# Patient Record
Sex: MALE
[De-identification: names, ages, dates, MRNs, and addresses within clinical notes are randomized per-mention and may not be internally consistent; named-entity substitution may affect disease eponyms.]

---

## 2022-05-31 PROBLEM — Z00.00 ENCOUNTER FOR PREVENTIVE HEALTH EXAMINATION: Status: ACTIVE | Noted: 2022-05-31

## 2022-06-01 ENCOUNTER — APPOINTMENT (OUTPATIENT)
Dept: ORTHOPEDIC SURGERY | Facility: CLINIC | Age: 62
End: 2022-06-01

## 2022-06-01 VITALS — WEIGHT: 220 LBS | HEIGHT: 68 IN | BODY MASS INDEX: 33.34 KG/M2

## 2022-06-01 DIAGNOSIS — Z78.9 OTHER SPECIFIED HEALTH STATUS: ICD-10-CM

## 2022-06-01 PROCEDURE — 99204 OFFICE O/P NEW MOD 45 MIN: CPT | Mod: 25

## 2022-06-01 PROCEDURE — 20610 DRAIN/INJ JOINT/BURSA W/O US: CPT | Mod: 50

## 2022-06-07 NOTE — PHYSICAL EXAM
[Left] : left knee [Right] : right knee [] : moderate effusion [AP] : anteroposterior [Lateral] : lateral [Mild tricompartmental OA medial narrowing] : Mild tricompartmental OA medial narrowing [FreeTextEntry3] : Varicose veins [TWNoteComboBox7] : flexion 120 degrees [de-identified] : extension 5 degrees

## 2022-06-07 NOTE — HISTORY OF PRESENT ILLNESS
[de-identified] : 6/1/22: 62M (Retired ) c/o bilateral knee pain but mainly left knee pain due to a bakers cyst. Saw his rheumatologist who ordered XR and venous doppler at [facility] for both knees; brought in reports. Received CSI injection in L knee 1 month ago w/ relief for short period of time.Rheum doc. C/O pain and swelling in the knees and over calf. No prior injury.

## 2022-06-07 NOTE — ASSESSMENT
[FreeTextEntry1] : 62 M with B/L knee OA with effusions\par \par 1) Aspiration only today, recent L csi with Rheum doc one month ago\par 2) discussed varicosities\par 3) benefits of H/A injections reviewed, will request auth for visco injections\par 4) cryotherapy\par 5) return to start visco series when auth

## 2022-06-07 NOTE — PROCEDURE
[Large Joint Injection] : Large joint injection [Bilateral] : bilaterally of the [Knee] : knee [Pain] : pain [Inflammation] : inflammation [X-ray evidence of Osteoarthritis on this or prior visit] : x-ray evidence of Osteoarthritis on this or prior visit [Alcohol] : alcohol [Betadine] : betadine [Ethyl Chloride sprayed topically] : ethyl chloride sprayed topically [Sterile technique used] : sterile technique used [___ cc    1%] : Lidocaine ~Vcc of 1%  [Effusion] : effusion [] : Patient tolerated procedure well [Call if redness, pain or fever occur] : call if redness, pain or fever occur [Apply ice for 15min out of every hour for the next 12-24 hours as tolerated] : apply ice for 15 minutes out of every hour for the next 12-24 hours as tolerated [Patient was advised to rest the joint(s) for ____ days] : patient was advised to rest the joint(s) for [unfilled] days [Patient had decreased mobility in the joint] : patient had decreased mobility in the joint [de-identified] : L 75cc, R 35cc [de-identified] : clear

## 2022-06-08 ENCOUNTER — APPOINTMENT (OUTPATIENT)
Dept: ORTHOPEDIC SURGERY | Facility: CLINIC | Age: 62
End: 2022-06-08
Payer: COMMERCIAL

## 2022-06-08 VITALS — WEIGHT: 220 LBS | HEIGHT: 68 IN | BODY MASS INDEX: 33.34 KG/M2

## 2022-06-08 PROCEDURE — 99212 OFFICE O/P EST SF 10 MIN: CPT | Mod: 25

## 2022-06-08 PROCEDURE — 20610 DRAIN/INJ JOINT/BURSA W/O US: CPT

## 2022-06-08 NOTE — HISTORY OF PRESENT ILLNESS
[de-identified] : 6/8/22: Here for b/l knee euflexxa injection #1. \par \par 6/1/22: 62M (Retired ) c/o bilateral knee pain but mainly left knee pain due to a bakers cyst. Saw his rheumatologist who ordered XR and venous doppler at [facility] for both knees; brought in reports. Received CSI injection in L knee 1 month ago w/ relief for short period of time.Rheum doc. C/O pain and swelling in the knees and over calf. No prior injury.

## 2022-06-08 NOTE — DISCUSSION/SUMMARY
[de-identified] : 62m with b/l knee djd. Euflexxa #1 Injection tolerated well. Post injection instructions reviewed.\par 1) wbat, cryotherapy\par 2) rtc 1 week\par \par Entered by Keshia Johnson acting as scribe.\par

## 2022-06-08 NOTE — DISCUSSION/SUMMARY
[de-identified] : 62m with b/l knee djd. Euflexxa #1 Injection tolerated well. Post injection instructions reviewed.\par 1) wbat, cryotherapy\par 2) rtc 1 week\par \par Entered by Keshia Johnson acting as scribe.\par

## 2022-06-08 NOTE — HISTORY OF PRESENT ILLNESS
[de-identified] : 6/8/22: Here for b/l knee euflexxa injection #1. \par \par 6/1/22: 62M (Retired ) c/o bilateral knee pain but mainly left knee pain due to a bakers cyst. Saw his rheumatologist who ordered XR and venous doppler at [facility] for both knees; brought in reports. Received CSI injection in L knee 1 month ago w/ relief for short period of time.Rheum doc. C/O pain and swelling in the knees and over calf. No prior injury.

## 2022-06-15 ENCOUNTER — APPOINTMENT (OUTPATIENT)
Dept: ORTHOPEDIC SURGERY | Facility: CLINIC | Age: 62
End: 2022-06-15
Payer: COMMERCIAL

## 2022-06-15 PROCEDURE — 20611 DRAIN/INJ JOINT/BURSA W/US: CPT

## 2022-06-15 PROCEDURE — 99212 OFFICE O/P EST SF 10 MIN: CPT | Mod: 25

## 2022-06-15 NOTE — HISTORY OF PRESENT ILLNESS
[de-identified] : 6/15/22: Here to f/up b/l knees and Euflexxa #2\par 6/8/22: Here for b/l knee euflexxa injection #1. \par \par 6/1/22: 62M (Retired ) c/o bilateral knee pain but mainly left knee pain due to a bakers cyst. Saw his rheumatologist who ordered XR and venous doppler at [facility] for both knees; brought in reports. Received CSI injection in L knee 1 month ago w/ relief for short period of time.Rheum doc. C/O pain and swelling in the knees and over calf. No prior injury.

## 2022-06-15 NOTE — PHYSICAL EXAM
[FreeTextEntry3] : Varicose veins [TWNoteComboBox7] : flexion 120 degrees [de-identified] : extension 5 degrees [Left] : left knee [Right] : right knee [] : moderate effusion [AP] : anteroposterior [Lateral] : lateral [Mild tricompartmental OA medial narrowing] : Mild tricompartmental OA medial narrowing

## 2022-06-15 NOTE — PROCEDURE
[Effusion] : effusion [#1] : series #1 [de-identified] : L 14.5cc, R 18cc [de-identified] : clear [Large Joint Injection] : Large joint injection [Bilateral] : bilaterally of the [Knee] : knee [Pain] : pain [Inflammation] : inflammation [X-ray evidence of Osteoarthritis on this or prior visit] : x-ray evidence of Osteoarthritis on this or prior visit [Alcohol] : alcohol [Betadine] : betadine [Ethyl Chloride sprayed topically] : ethyl chloride sprayed topically [Sterile technique used] : sterile technique used [___ cc    1%] : Lidocaine ~Vcc of 1%  [Euflexxa] : Euflexxa [#2] : series #2 [] : Patient tolerated procedure well [Call if redness, pain or fever occur] : call if redness, pain or fever occur [Apply ice for 15min out of every hour for the next 12-24 hours as tolerated] : apply ice for 15 minutes out of every hour for the next 12-24 hours as tolerated [Patient was advised to rest the joint(s) for ____ days] : patient was advised to rest the joint(s) for [unfilled] days [Previous OTC use and PT nontherapeutic] : patient has tried OTC's including aspirin, Ibuprofen, Aleve, etc or prescription NSAIDS, and/or exercises at home and/or physical therapy without satisfactory response [Patient had decreased mobility in the joint] : patient had decreased mobility in the joint

## 2022-06-15 NOTE — PHYSICAL EXAM
[FreeTextEntry3] : Varicose veins [TWNoteComboBox7] : flexion 120 degrees [de-identified] : extension 5 degrees [Left] : left knee [Right] : right knee [] : moderate effusion [AP] : anteroposterior [Lateral] : lateral [Mild tricompartmental OA medial narrowing] : Mild tricompartmental OA medial narrowing

## 2022-06-15 NOTE — PHYSICAL EXAM
[FreeTextEntry3] : Varicose veins [TWNoteComboBox7] : flexion 120 degrees [de-identified] : extension 5 degrees

## 2022-06-15 NOTE — PROCEDURE
[Effusion] : effusion [#1] : series #1 [de-identified] : L 14.5cc, R 18cc [de-identified] : clear [Large Joint Injection] : Large joint injection [Bilateral] : bilaterally of the [Knee] : knee [Pain] : pain [Inflammation] : inflammation [X-ray evidence of Osteoarthritis on this or prior visit] : x-ray evidence of Osteoarthritis on this or prior visit [Alcohol] : alcohol [Betadine] : betadine [Ethyl Chloride sprayed topically] : ethyl chloride sprayed topically [Sterile technique used] : sterile technique used [___ cc    1%] : Lidocaine ~Vcc of 1%  [Euflexxa] : Euflexxa [#2] : series #2 [] : Patient tolerated procedure well [Call if redness, pain or fever occur] : call if redness, pain or fever occur [Apply ice for 15min out of every hour for the next 12-24 hours as tolerated] : apply ice for 15 minutes out of every hour for the next 12-24 hours as tolerated [Patient was advised to rest the joint(s) for ____ days] : patient was advised to rest the joint(s) for [unfilled] days [Previous OTC use and PT nontherapeutic] : patient has tried OTC's including aspirin, Ibuprofen, Aleve, etc or prescription NSAIDS, and/or exercises at home and/or physical therapy without satisfactory response [Patient had decreased mobility in the joint] : patient had decreased mobility in the joint

## 2022-06-15 NOTE — DISCUSSION/SUMMARY
[de-identified] : 62m with b/l knee djd. Euflexxa #2 Injection tolerated well. Post injection instructions reviewed.\par 1) wbat, cryotherapy\par 2) rtc 1 week\par \par Entered by Keshia Johnson acting as scribe.\par

## 2022-06-15 NOTE — PROCEDURE
[Prior failure or difficult injection] : prior failure or difficult injection [All ultrasound images have been permanently captured and stored accordingly in our picture archiving and communication system] : All ultrasound images have been permanently captured and stored accordingly in our picture archiving and communication system

## 2022-06-22 ENCOUNTER — APPOINTMENT (OUTPATIENT)
Dept: ORTHOPEDIC SURGERY | Facility: CLINIC | Age: 62
End: 2022-06-22
Payer: COMMERCIAL

## 2022-06-22 VITALS — HEIGHT: 68 IN | BODY MASS INDEX: 33.34 KG/M2 | WEIGHT: 220 LBS

## 2022-06-22 PROCEDURE — 99212 OFFICE O/P EST SF 10 MIN: CPT | Mod: 25

## 2022-06-22 PROCEDURE — 20611 DRAIN/INJ JOINT/BURSA W/US: CPT | Mod: 50

## 2022-06-22 NOTE — PHYSICAL EXAM
[Left] : left knee [Right] : right knee [] : moderate effusion [AP] : anteroposterior [Lateral] : lateral [Mild tricompartmental OA medial narrowing] : Mild tricompartmental OA medial narrowing [FreeTextEntry3] : Varicose veins [TWNoteComboBox7] : flexion 120 degrees [de-identified] : extension 5 degrees

## 2022-06-22 NOTE — PROCEDURE
[Large Joint Injection] : Large joint injection [Bilateral] : bilaterally of the [Knee] : knee [Pain] : pain [Inflammation] : inflammation [X-ray evidence of Osteoarthritis on this or prior visit] : x-ray evidence of Osteoarthritis on this or prior visit [Alcohol] : alcohol [Betadine] : betadine [Ethyl Chloride sprayed topically] : ethyl chloride sprayed topically [Sterile technique used] : sterile technique used [___ cc    1%] : Lidocaine ~Vcc of 1%  [Euflexxa] : Euflexxa [] : Patient tolerated procedure well [Call if redness, pain or fever occur] : call if redness, pain or fever occur [Apply ice for 15min out of every hour for the next 12-24 hours as tolerated] : apply ice for 15 minutes out of every hour for the next 12-24 hours as tolerated [Patient was advised to rest the joint(s) for ____ days] : patient was advised to rest the joint(s) for [unfilled] days [Previous OTC use and PT nontherapeutic] : patient has tried OTC's including aspirin, Ibuprofen, Aleve, etc or prescription NSAIDS, and/or exercises at home and/or physical therapy without satisfactory response [Patient had decreased mobility in the joint] : patient had decreased mobility in the joint [Prior failure or difficult injection] : prior failure or difficult injection [All ultrasound images have been permanently captured and stored accordingly in our picture archiving and communication system] : All ultrasound images have been permanently captured and stored accordingly in our picture archiving and communication system [#3] : series #3

## 2022-06-22 NOTE — DISCUSSION/SUMMARY
[de-identified] : 62m with b/l knee djd. Euflexxa #3 Injection tolerated well. Post injection instructions reviewed.\par 1) wbat, cryotherapy\par 2) rtc 6  weeks\par \par Entered by Keshia Johnson acting as scribe.\par

## 2022-06-22 NOTE — HISTORY OF PRESENT ILLNESS
[de-identified] : 6/22/22: Here for b/l knees euflexxa injection #3. Both are slightly better since first injection but still c/o pain. \par 6/15/22: Here to f/up b/l knees and Euflexxa #2\par 6/8/22: Here for b/l knee euflexxa injection #1. \par \par 6/1/22: 62M (Retired ) c/o bilateral knee pain but mainly left knee pain due to a bakers cyst. Saw his rheumatologist who ordered XR and venous doppler at [facility] for both knees; brought in reports. Received CSI injection in L knee 1 month ago w/ relief for short period of time.Rheum doc. C/O pain and swelling in the knees and over calf. No prior injury.

## 2022-08-03 ENCOUNTER — APPOINTMENT (OUTPATIENT)
Dept: ORTHOPEDIC SURGERY | Facility: CLINIC | Age: 62
End: 2022-08-03

## 2022-08-03 VITALS — BODY MASS INDEX: 33.34 KG/M2 | WEIGHT: 220 LBS | HEIGHT: 68 IN

## 2022-08-03 DIAGNOSIS — M25.462 EFFUSION, LEFT KNEE: ICD-10-CM

## 2022-08-03 DIAGNOSIS — E11.9 TYPE 2 DIABETES MELLITUS W/OUT COMPLICATIONS: ICD-10-CM

## 2022-08-03 DIAGNOSIS — M25.461 EFFUSION, RIGHT KNEE: ICD-10-CM

## 2022-08-03 PROCEDURE — 99213 OFFICE O/P EST LOW 20 MIN: CPT

## 2022-08-03 RX ORDER — MELOXICAM 15 MG/1
15 TABLET ORAL
Qty: 90 | Refills: 1 | Status: ACTIVE | COMMUNITY
Start: 2022-08-03 | End: 1900-01-01

## 2022-08-03 NOTE — PHYSICAL EXAM
[Left] : left knee [Right] : right knee [AP] : anteroposterior [Lateral] : lateral [Mild tricompartmental OA medial narrowing] : Mild tricompartmental OA medial narrowing [] : effusion [FreeTextEntry3] : Varicose veins [TWNoteComboBox7] : flexion 120 degrees [de-identified] : extension 5 degrees

## 2022-08-03 NOTE — PROCEDURE
[Bilateral] : bilaterally of the [Euflexxa] : Euflexxa [#3] : series #3 [] : Patient tolerated procedure well [Call if redness, pain or fever occur] : call if redness, pain or fever occur [Apply ice for 15min out of every hour for the next 12-24 hours as tolerated] : apply ice for 15 minutes out of every hour for the next 12-24 hours as tolerated [Patient was advised to rest the joint(s) for ____ days] : patient was advised to rest the joint(s) for [unfilled] days [Previous OTC use and PT nontherapeutic] : patient has tried OTC's including aspirin, Ibuprofen, Aleve, etc or prescription NSAIDS, and/or exercises at home and/or physical therapy without satisfactory response [Patient had decreased mobility in the joint] : patient had decreased mobility in the joint [Prior failure or difficult injection] : prior failure or difficult injection [All ultrasound images have been permanently captured and stored accordingly in our picture archiving and communication system] : All ultrasound images have been permanently captured and stored accordingly in our picture archiving and communication system

## 2022-08-03 NOTE — HISTORY OF PRESENT ILLNESS
[de-identified] : 8-[birth year]-33 He is known to have had csi from rheum in may (Both knees), 6-1 aspirated by us, followed by Euflexxa injections both completed 6/22. Notes improvement but still pain and limitations with ambulation \par \par \par 6/22/22: Here for b/l knees euflexxa injection #3. Both are slightly better since first injection but still c/o pain. \par 6/15/22: Here to f/up b/l knees and Euflexxa #2\par 6/8/22: Here for b/l knee euflexxa injection #1. \par \par 6/1/22: 62M (Retired ) c/o bilateral knee pain but mainly left knee pain due to a bakers cyst. Saw his rheumatologist who ordered XR and venous doppler at [facility] for both knees; brought in reports. Received CSI injection in L knee 1 month ago w/ relief for short period of time.Rheum doc. C/O pain and swelling in the knees and over calf. No prior injury.

## 2022-08-03 NOTE — ASSESSMENT
[FreeTextEntry1] : discussed the role of possible repeat csi (but he last had in may and has pmh dm) repeat ha injections in 6 months , possible total knee replacement when all fails. 
shortly

## 2022-09-07 ENCOUNTER — APPOINTMENT (OUTPATIENT)
Dept: ORTHOPEDIC SURGERY | Facility: CLINIC | Age: 62
End: 2022-09-07

## 2022-09-07 VITALS — WEIGHT: 220 LBS | BODY MASS INDEX: 33.34 KG/M2 | HEIGHT: 68 IN

## 2022-09-07 DIAGNOSIS — M17.11 UNILATERAL PRIMARY OSTEOARTHRITIS, RIGHT KNEE: ICD-10-CM

## 2022-09-07 DIAGNOSIS — M17.12 UNILATERAL PRIMARY OSTEOARTHRITIS, LEFT KNEE: ICD-10-CM

## 2022-09-07 PROCEDURE — J3490M: CUSTOM

## 2022-09-07 PROCEDURE — 20610 DRAIN/INJ JOINT/BURSA W/O US: CPT | Mod: 50

## 2022-09-07 PROCEDURE — 99213 OFFICE O/P EST LOW 20 MIN: CPT | Mod: 25

## 2022-09-07 NOTE — PROCEDURE
[] : Patient tolerated procedure well [Call if redness, pain or fever occur] : call if redness, pain or fever occur [Apply ice for 15min out of every hour for the next 12-24 hours as tolerated] : apply ice for 15 minutes out of every hour for the next 12-24 hours as tolerated [Patient was advised to rest the joint(s) for ____ days] : patient was advised to rest the joint(s) for [unfilled] days [Previous OTC use and PT nontherapeutic] : patient has tried OTC's including aspirin, Ibuprofen, Aleve, etc or prescription NSAIDS, and/or exercises at home and/or physical therapy without satisfactory response [Patient had decreased mobility in the joint] : patient had decreased mobility in the joint [Prior failure or difficult injection] : prior failure or difficult injection [All ultrasound images have been permanently captured and stored accordingly in our picture archiving and communication system] : All ultrasound images have been permanently captured and stored accordingly in our picture archiving and communication system [FreeTextEntry3] : Large joint injection was performed of the b/l knees. An injection of Lidocaine 3cc of 1% , Bupivacaine (Marcaine) 6cc of 0.5% , Triamcinolone (Kenalog) 2cc of 40 mg  was used. \par Patient was advised to call if redness, pain or fever occur and apply ice for 15 minutes out of every hour for the next 12-24 hours as tolerated. \par \par Patient has tried OTC's including aspirin, Ibuprofen, Aleve, etc or prescription NSAIDS, and/or exercises at home and/or physical therapy without satisfactory response, patient had decreased mobility in the joint and the risks benefits, and alternatives have been discussed, and verbal consent was obtained. \par The site was prepped with alcohol, betadine and ethyl chloride sprayed topically\par \par The risks, benefits and contents of the injection have been discussed.  Risks include but are not limited to allergic reaction, flare reaction, permanent white skin discoloration at the injection site and infection.  The patient understands the risks and agrees to having the injection.  All questions have been answered.\par \par

## 2022-09-07 NOTE — PHYSICAL EXAM
[Left] : left knee [Right] : right knee [AP] : anteroposterior [Lateral] : lateral [Mild tricompartmental OA medial narrowing] : Mild tricompartmental OA medial narrowing [] : mild effusion [FreeTextEntry3] : Varicose veins [TWNoteComboBox7] : flexion 120 degrees [de-identified] : extension 5 degrees

## 2022-09-07 NOTE — HISTORY OF PRESENT ILLNESS
[de-identified] : 9/7/22: Here for follow-up B/L knees. No noticeable improvement since last visit, Pain has remained the same. \par \par 8-3-33 He is known to have had csi from rheum in may (Both knees), 6-1 aspirated by us, followed by Euflexxa injections both completed 6/22. Notes improvement but still pain and limitations with ambulation \par \par 6/22/22: Here for b/l knees euflexxa injection #3. Both are slightly better since first injection but still c/o pain. \par 6/15/22: Here to f/up b/l knees and Euflexxa #2\par 6/8/22: Here for b/l knee euflexxa injection #1. \par \par 6/1/22: 62M (Retired ) c/o bilateral knee pain but mainly left knee pain due to a bakers cyst. Saw his rheumatologist who ordered XR and venous doppler at [facility] for both knees; brought in reports. Received CSI injection in L knee 1 month ago w/ relief for short period of time.Rheum doc. C/O pain and swelling in the knees and over calf. No prior injury.

## 2022-09-07 NOTE — ASSESSMENT
[FreeTextEntry1] : 62m with b/l knee djd. Can repeat visco injections 12/23 or after. \par \par 1) csi b/l knees today - tolerated well  - aspiration right knee 45cc\par 2) cryotherapy, nsaids\par 3) rtc for repeat visco injections\par \par

## 2022-09-21 ENCOUNTER — APPOINTMENT (OUTPATIENT)
Dept: ORTHOPEDIC SURGERY | Facility: CLINIC | Age: 62
End: 2022-09-21

## 2022-09-21 VITALS — WEIGHT: 220 LBS | HEIGHT: 68 IN | BODY MASS INDEX: 33.34 KG/M2

## 2022-09-21 NOTE — HISTORY OF PRESENT ILLNESS
[Gradual] : gradual [10] : 10 [2] : 2 [Dull/Aching] : dull/aching [Sharp] : sharp [Intermittent] : intermittent [Injection therapy] : injection therapy [Nothing helps with pain getting better] : Nothing helps with pain getting better [Tightness] : tightness [] : no [FreeTextEntry1] : bilateral knees  [FreeTextEntry5] : pt states he was pain and swelling .Has gotten gel and cortisone injections but still has pain  [de-identified] : movement  [de-identified] : 9/7/22 [de-identified] : MALISSA

## 2022-10-13 NOTE — REASON FOR VISIT
[FreeTextEntry2] : Void visit.Patient incorrectly scheduled. Dr. Novak intended for patient to see joint replacement specialist.

## 2023-11-14 ENCOUNTER — TRANSCRIPTION ENCOUNTER (OUTPATIENT)
Age: 63
End: 2023-11-14